# Patient Record
Sex: FEMALE | Race: OTHER | Employment: FULL TIME | URBAN - METROPOLITAN AREA
[De-identification: names, ages, dates, MRNs, and addresses within clinical notes are randomized per-mention and may not be internally consistent; named-entity substitution may affect disease eponyms.]

---

## 2023-02-14 ENCOUNTER — HOSPITAL ENCOUNTER (OUTPATIENT)
Age: 33
Setting detail: SPECIMEN
Discharge: HOME | End: 2023-02-14

## 2023-02-14 ENCOUNTER — OFFICE VISIT (OUTPATIENT)
Dept: OBGYN CLINIC | Age: 33
End: 2023-02-14
Payer: COMMERCIAL

## 2023-02-14 VITALS
BODY MASS INDEX: 36.32 KG/M2 | DIASTOLIC BLOOD PRESSURE: 84 MMHG | WEIGHT: 185 LBS | HEIGHT: 60 IN | SYSTOLIC BLOOD PRESSURE: 124 MMHG

## 2023-02-14 DIAGNOSIS — Z01.419 VISIT FOR GYNECOLOGIC EXAMINATION: Primary | ICD-10-CM

## 2023-02-14 PROCEDURE — 99395 PREV VISIT EST AGE 18-39: CPT

## 2023-02-14 RX ORDER — SOLIFENACIN SUCCINATE 5 MG/1
TABLET, FILM COATED ORAL
COMMUNITY
Start: 2023-02-02

## 2023-02-14 ASSESSMENT — ENCOUNTER SYMPTOMS
CONSTIPATION: 0
DIARRHEA: 0
ABDOMINAL PAIN: 0
SHORTNESS OF BREATH: 0

## 2023-02-14 NOTE — PROGRESS NOTES
YEARLY PHYSICAL    Date of service: 2023    Chandrakant Mata  Is a 29 y.o. female    PT's PCP is: Kathy Zee MD     : 1990                                         Chaperone for Intimate Exam  Chaperone was offered as part of the rooming process. Patient declined and agrees to continue with exam without a chaperone. Subjective:       Patient's last menstrual period was 2023 (approximate). Are your menses regular: yes    OB History    Para Term  AB Living   0 0 0 0 0 0   SAB IAB Ectopic Molar Multiple Live Births   0 0 0 0 0 0        Social History     Tobacco Use   Smoking Status Never   Smokeless Tobacco Never        Social History     Substance and Sexual Activity   Alcohol Use No    Alcohol/week: 0.0 standard drinks       Family History   Problem Relation Age of Onset    Cancer Paternal Grandfather         brain tumor    Diabetes Paternal Grandmother     Heart Disease Maternal Grandfather     Other Maternal Grandfather         AD    Other Brother         ADHD    Breast Cancer Maternal Great Grandmother        Any family history of breast or ovarian cancer:  Yes    Any family history of blood clots: No      Allergies: Contrast [iodides], Morphine, and Shellfish-derived products      Current Outpatient Medications:     solifenacin (VESICARE) 5 MG tablet, take 1 tablet by mouth once daily, Disp: , Rfl:     Social History     Substance and Sexual Activity   Sexual Activity Yes       Any bleeding or pain with intercourse: No    Last Yearly:  10 yrs ago    Last pap: 10 yrs ago    Last HPV: unsure    Last Mammogram: never    Last Dexascan never    Last colorectal screen- type:colonoscopy*  date  as a teenager    Do you do self breast exams: No    Past Medical History:   Diagnosis Date    Depression     Endometriosis     POTS (postural orthostatic tachycardia syndrome)        Past Surgical History: Procedure Laterality Date    ABDOMEN SURGERY      endometriosis    HERNIA REPAIR      right inguinal hernia    HIP SURGERY      KNEE SURGERY      right ACL repair       Family History   Problem Relation Age of Onset    Cancer Paternal Grandfather         brain tumor    Diabetes Paternal Grandmother     Heart Disease Maternal Grandfather     Other Maternal Grandfather         AD    Other Brother         ADHD    Breast Cancer Maternal Great Grandmother        Chief Complaint   Patient presents with    Annual Exam     Here for annual exam. States last annual and pap was about 10 years ago. Dysmenorrhea     Has been having issues with very painful and heavy cycles. They are very irregular in length. PE:  Vital Signs  Blood pressure 124/84, height 5' (1.524 m), weight 185 lb (83.9 kg), last menstrual period 01/31/2023, not currently breastfeeding. Estimated body mass index is 36.13 kg/m² as calculated from the following:    Height as of this encounter: 5' (1.524 m). Weight as of this encounter: 185 lb (83.9 kg). NURSE: JOSEPH Rene RN    HPI: Patient presents today for annual exam.  Denies breast concerns. Encouraged SBE. Denies bowel/bladder concerns - sees [facility] urology. Denies abnormal discharge, pain with intercourse. Due for Pap smear today. Does report cycles are irregular, heavy - declines any cycle control at this time. Review of Systems   Constitutional:  Negative for chills, fatigue and fever. Respiratory:  Negative for shortness of breath. Cardiovascular:  Negative for chest pain. Gastrointestinal:  Negative for abdominal pain, constipation and diarrhea. Genitourinary:  Positive for menstrual problem (irregular, heavy). Negative for dyspareunia, dysuria, frequency, pelvic pain, urgency, vaginal bleeding and vaginal discharge. Neurological:  Negative for dizziness, light-headedness and headaches. Physical Exam  Constitutional:       Appearance: Normal appearance. Genitourinary:      Vulva normal.      Right Labia: No rash, tenderness or lesions. Left Labia: No tenderness, lesions or rash. Vaginal discharge present. No vaginal tenderness or bleeding. Right Adnexa: not tender and not full. Left Adnexa: not tender and not full. No cervical motion tenderness or discharge. Uterus is not enlarged or tender. Pelvic exam was performed with patient in the lithotomy position. Breasts:     Breasts are symmetrical.      Right: No inverted nipple, nipple discharge, skin change or tenderness. Left: No inverted nipple, nipple discharge, skin change or tenderness. HENT:      Head: Normocephalic and atraumatic. Mouth/Throat:      Mouth: Mucous membranes are moist.   Eyes:      Extraocular Movements: Extraocular movements intact. Cardiovascular:      Rate and Rhythm: Normal rate. Pulmonary:      Effort: Pulmonary effort is normal.   Abdominal:      General: There is no distension. Palpations: Abdomen is soft. Tenderness: There is no abdominal tenderness. Musculoskeletal:         General: Normal range of motion. Cervical back: Normal range of motion. Neurological:      General: No focal deficit present. Mental Status: She is alert and oriented to person, place, and time. Skin:     General: Skin is warm and dry. Psychiatric:         Mood and Affect: Mood normal.         Behavior: Behavior normal.         Thought Content: Thought content normal.         Judgment: Judgment normal.   Chaperone present: chaperone declined. Assessment and Plan          Diagnosis Orders   1. Visit for gynecologic examination  PAP SMEAR        Repeat Annual every 1 year  Cervical Cytology Evaluation begins at 24years old. If Negative Cytology, Follow-up screening per current guidelines. Mammograms every 1year. If 37 yo and last mammogram was negative. Routine healthmaintenance per patients PCP.       I am having Matias Sweeney maintain her solifenacin. Return in about 1 year (around 2/14/2024) for annual.    She was also counseled on her preventative health maintenance recommendations and follow-up. There are no Patient Instructions on file for this visit.     Taylor Baca PA-C,2/14/2023 1:45 PM                 Electronically Signed by Taylor Baca PA-C

## 2023-02-17 LAB
HPV SAMPLE: NORMAL
HPV, GENOTYPE 16: NOT DETECTED
HPV, GENOTYPE 18: NOT DETECTED
HPV, HIGH RISK OTHER: NOT DETECTED
HPV, INTERPRETATION: NORMAL
SPECIMEN DESCRIPTION: NORMAL